# Patient Record
Sex: FEMALE | Race: BLACK OR AFRICAN AMERICAN | NOT HISPANIC OR LATINO | URBAN - METROPOLITAN AREA
[De-identification: names, ages, dates, MRNs, and addresses within clinical notes are randomized per-mention and may not be internally consistent; named-entity substitution may affect disease eponyms.]

---

## 2023-11-06 ENCOUNTER — APPOINTMENT (OUTPATIENT)
Dept: OBSTETRICS AND GYNECOLOGY | Facility: CLINIC | Age: 58
End: 2023-11-06
Payer: COMMERCIAL

## 2023-11-20 ENCOUNTER — APPOINTMENT (OUTPATIENT)
Dept: OBSTETRICS AND GYNECOLOGY | Facility: CLINIC | Age: 58
End: 2023-11-20
Payer: COMMERCIAL

## 2023-12-04 ENCOUNTER — APPOINTMENT (OUTPATIENT)
Dept: OBSTETRICS AND GYNECOLOGY | Facility: CLINIC | Age: 58
End: 2023-12-04
Payer: COMMERCIAL

## 2024-08-05 ENCOUNTER — HOSPITAL ENCOUNTER (OUTPATIENT)
Dept: RADIOLOGY | Facility: HOSPITAL | Age: 59
Discharge: HOME | End: 2024-08-05
Payer: COMMERCIAL

## 2024-08-05 ENCOUNTER — OFFICE VISIT (OUTPATIENT)
Dept: ORTHOPEDIC SURGERY | Facility: CLINIC | Age: 59
End: 2024-08-05
Payer: COMMERCIAL

## 2024-08-05 VITALS — HEIGHT: 68 IN | BODY MASS INDEX: 41.52 KG/M2 | WEIGHT: 274 LBS

## 2024-08-05 DIAGNOSIS — S89.91XD RIGHT KNEE INJURY, SUBSEQUENT ENCOUNTER: Primary | ICD-10-CM

## 2024-08-05 DIAGNOSIS — S89.91XD RIGHT KNEE INJURY, SUBSEQUENT ENCOUNTER: ICD-10-CM

## 2024-08-05 PROCEDURE — 3008F BODY MASS INDEX DOCD: CPT

## 2024-08-05 PROCEDURE — 1036F TOBACCO NON-USER: CPT

## 2024-08-05 PROCEDURE — 73564 X-RAY EXAM KNEE 4 OR MORE: CPT | Mod: RT

## 2024-08-05 PROCEDURE — 99204 OFFICE O/P NEW MOD 45 MIN: CPT

## 2024-08-05 PROCEDURE — 73564 X-RAY EXAM KNEE 4 OR MORE: CPT | Mod: RIGHT SIDE | Performed by: RADIOLOGY

## 2024-08-05 RX ORDER — METFORMIN HYDROCHLORIDE 500 MG/1
500 TABLET ORAL
COMMUNITY

## 2024-08-05 RX ORDER — ROSUVASTATIN CALCIUM 10 MG/1
10 TABLET, COATED ORAL DAILY
COMMUNITY

## 2024-08-05 ASSESSMENT — PAIN - FUNCTIONAL ASSESSMENT: PAIN_FUNCTIONAL_ASSESSMENT: 0-10

## 2024-08-05 ASSESSMENT — PAIN SCALES - GENERAL: PAINLEVEL_OUTOF10: 7

## 2024-08-05 NOTE — PROGRESS NOTES
SIMA  Kanwal Mccarty is a 59 y.o. female  in office today for   Chief Complaint   Patient presents with    Right Knee - Injury     Pt fell Saturday while on a cruise, she was seen there and given a brace. She is having trouble bearing weight, has been icing and elevating. Wearing the brace 8 hours a day.    .  she has been taking tramadol for pain which does help some      Medication  Current Outpatient Medications on File Prior to Visit   Medication Sig Dispense Refill    metFORMIN (Glucophage) 500 mg tablet Take 1 tablet (500 mg) by mouth 2 times daily (morning and late afternoon).      rosuvastatin (Crestor) 10 mg tablet Take 1 tablet (10 mg) by mouth once daily.       No current facility-administered medications on file prior to visit.       Physical Exam  Constitutional: well developed, well nourished female in no acute distress  Psychiatric: normal mood, appropriate affect  Eyes: sclera anicteric  HENT: normocephalic/atraumatic  CV: regular rate and rhythm   Respiratory: non labored breathing  Integumentary: no rash  Neurological: moves all extremities    Right Knee Exam     Tenderness   The patient is experiencing tenderness in the medial joint line and MCL.    Range of Motion   Extension:  0   Flexion:  50     Tests   Nieves:  Medial - positive Lateral - negative  Varus: negative Valgus: negative  Drawer:  Anterior - trace    Posterior - trace  Patellar apprehension: negative    Other   Erythema: absent  Scars: absent  Sensation: normal  Swelling: moderate              Imaging/Lab:  X-rays were taken today which were reviewed by myself and read by myself and show no acute fracture or dislocation.  Moderate degenerative changes worse medially with joint space narrowing and osteophyte formation      Assessment  Assessment: right knee injury    Plan  Plan:  History, physical exam, and imaging were reviewed with patient. Given mechanism of injury, decrease in ROM, inability to bear weight, positive PE,  entered MRI for concern of ACL and meniscus injury.  In the meantime, continue with brace, NWB, RICE and pain medication.  Follow Up: will follow up after MRI    All questions were answered for the patient prior to end of exam and patient addressed their understanding.    Lana Dorsey PA-C  08/05/24

## 2024-08-06 ENCOUNTER — HOSPITAL ENCOUNTER (OUTPATIENT)
Dept: RADIOLOGY | Facility: HOSPITAL | Age: 59
Discharge: HOME | End: 2024-08-06
Payer: COMMERCIAL

## 2024-08-06 DIAGNOSIS — S89.91XD RIGHT KNEE INJURY, SUBSEQUENT ENCOUNTER: ICD-10-CM

## 2024-08-06 PROCEDURE — 73721 MRI JNT OF LWR EXTRE W/O DYE: CPT | Mod: RIGHT SIDE | Performed by: RADIOLOGY

## 2024-08-06 PROCEDURE — 73721 MRI JNT OF LWR EXTRE W/O DYE: CPT | Mod: RT

## 2024-08-08 ENCOUNTER — TELEPHONE (OUTPATIENT)
Dept: ORTHOPEDIC SURGERY | Facility: CLINIC | Age: 59
End: 2024-08-08
Payer: COMMERCIAL

## 2024-08-08 NOTE — TELEPHONE ENCOUNTER
----- Message from Debra CASTANEDA sent at 8/8/2024  1:44 PM EDT -----  Left a message for the patient to call back in regards to the MRI results and next steps-per Lana Dorsey below.  ----- Message -----  From: Lana Dorsey PA-C  Sent: 8/8/2024   8:07 AM EDT  To: Debra Mills MA    Can you call this lady about her MRI results.  Let her know she has a meniscus tear.  She can be WB as tolerated, let her know we recommend PT and steroid injection before we would consider surgery.  She can schedule with me to review MRI further and if she wants injection.  ----- Message -----  From: Interface, Radiology Results In  Sent: 8/6/2024   3:51 PM EDT  To: Lana Dorsey PA-C

## 2024-08-09 ENCOUNTER — OFFICE VISIT (OUTPATIENT)
Dept: ORTHOPEDIC SURGERY | Facility: CLINIC | Age: 59
End: 2024-08-09
Payer: COMMERCIAL

## 2024-08-09 VITALS — BODY MASS INDEX: 41.52 KG/M2 | HEIGHT: 68 IN | WEIGHT: 274 LBS

## 2024-08-09 DIAGNOSIS — S83.221A PERIPHERAL TEAR OF MEDIAL MENISCUS OF RIGHT KNEE AS CURRENT INJURY, INITIAL ENCOUNTER: ICD-10-CM

## 2024-08-09 DIAGNOSIS — M25.561 RIGHT KNEE PAIN, UNSPECIFIED CHRONICITY: Primary | ICD-10-CM

## 2024-08-09 PROCEDURE — 2500000005 HC RX 250 GENERAL PHARMACY W/O HCPCS

## 2024-08-09 PROCEDURE — 20610 DRAIN/INJ JOINT/BURSA W/O US: CPT | Mod: RT

## 2024-08-09 PROCEDURE — 1036F TOBACCO NON-USER: CPT

## 2024-08-09 PROCEDURE — 3008F BODY MASS INDEX DOCD: CPT

## 2024-08-09 PROCEDURE — 99213 OFFICE O/P EST LOW 20 MIN: CPT

## 2024-08-09 PROCEDURE — 2500000004 HC RX 250 GENERAL PHARMACY W/ HCPCS (ALT 636 FOR OP/ED)

## 2024-08-09 RX ORDER — LIDOCAINE HYDROCHLORIDE 5 MG/ML
4 INJECTION, SOLUTION INFILTRATION; PERINEURAL
Status: COMPLETED | OUTPATIENT
Start: 2024-08-09 | End: 2024-08-09

## 2024-08-09 RX ORDER — TRIAMCINOLONE ACETONIDE 40 MG/ML
40 INJECTION, SUSPENSION INTRA-ARTICULAR; INTRAMUSCULAR
Status: COMPLETED | OUTPATIENT
Start: 2024-08-09 | End: 2024-08-09

## 2024-08-09 ASSESSMENT — PAIN - FUNCTIONAL ASSESSMENT: PAIN_FUNCTIONAL_ASSESSMENT: 0-10

## 2024-08-09 ASSESSMENT — PAIN DESCRIPTION - DESCRIPTORS: DESCRIPTORS: ACHING;SHARP;STABBING

## 2024-08-09 ASSESSMENT — PAIN SCALES - GENERAL: PAINLEVEL_OUTOF10: 7

## 2024-08-09 NOTE — PROGRESS NOTES
SIMA  Kanwal Mccarty is a 59 y.o. female in office today for follow up of side: right knee MRI review.  she states that the knee is feeling a little better than earlier in the week.  She can want a little better that this time.  She has continued to rest, elevating the knee.  Here to review MRI results and discuss next steps.      Physical Exam  Constitutional: well developed, well nourished female in no acute distress  Psychiatric: normal mood, appropriate affect  Eyes: sclera anicteric  HENT: normocephalic/atraumatic  CV: regular rate and rhythm   Respiratory: non labored breathing  Integumentary: no rash  Neurological: moves all extremities    Right Knee Exam     Tenderness   The patient is experiencing tenderness in the medial joint line.    Range of Motion   Extension:  0   Flexion:  80     Other   Erythema: absent  Scars: absent  Sensation: normal  Pulse: present  Swelling: mild  Effusion: no effusion present            Imaging/Lab:  MRI completed 8/6/24 which were reviewed by myself and read by radiology and show:   1. Medial meniscus tearing. There is blunting of the free margin of the posterior horn into posterior horn body junction of the medial meniscus possibly with a horizontal component of abnormal signal at the posterior horn body junction. Body of the medial meniscus is extruded peripherally. Lateral meniscus is intact.  2. Tricompartmental degenerative change of the knee, greatest in the patellofemoral joint space with hyaline articular cartilage fissuring/loss as above.  3. Multilobulated septated appearing structure of the anteromedial knee which may represent a ganglion cyst. There is possibly a communicating versus an adjacent blood vessel and a small vascular  malformation may be a differential consideration.  4. Small volume knee joint effusion.    Assessment  Assessment: Right knee meniscus tear, right knee pain    Plan  Plan:  History, physical exam, and imaging were reviewed with patient.  Discussed options for meniscus tears including conservative treatment of RICE, antiinflammatories, PT, and intjections versus surgical intervention such as a menisectomy.  Would like to proceed with conservative treatment at this time.  PT orders given to patient.  She is also electing for an injection and understands should she want to discuss surgery, it will be several months.  The right knee was injected per procedure note below.  She will also continue with RICE, NSAIDs, bracing as needed.  She is WB as tolerated.  Follow Up: Patient to follow up as needed if pain persists or gets worse.    Patient ID: Kanwal Mccarty is a 59 y.o. female.    L Inj/Asp: R knee on 8/9/2024 11:08 AM  Indications: pain  Details: 22 G needle, anterolateral approach  Medications: 40 mg triamcinolone acetonide 40 mg/mL; 4 mL lidocaine 5 mg/mL (0.5 %)  Outcome: tolerated well, no immediate complications  Procedure, treatment alternatives, risks and benefits explained, specific risks discussed. Consent was given by the patient. Immediately prior to procedure a time out was called to verify the correct patient, procedure, equipment, support staff and site/side marked as required. Patient was prepped and draped in the usual sterile fashion.           All questions were answered for the patient prior to end of exam and patient addressed their understanding.    Lana Dorsey PA-C  08/09/24

## 2024-08-14 ENCOUNTER — EVALUATION (OUTPATIENT)
Dept: PHYSICAL THERAPY | Facility: CLINIC | Age: 59
End: 2024-08-14
Payer: COMMERCIAL

## 2024-08-14 DIAGNOSIS — M25.561 RIGHT KNEE PAIN, UNSPECIFIED CHRONICITY: Primary | ICD-10-CM

## 2024-08-14 PROCEDURE — 97110 THERAPEUTIC EXERCISES: CPT | Mod: GP

## 2024-08-14 PROCEDURE — 97161 PT EVAL LOW COMPLEX 20 MIN: CPT | Mod: GP

## 2024-08-14 NOTE — PROGRESS NOTES
Physical Therapy Evaluation     Patient Name: Kanwal cMcarty  MRN: [id number]  Today's Date: 8/14/2024  Time Calculation  Start Time: 1114  Stop Time: 1159  Time Calculation (min): 45 min      Insurance:  Number of Treatments Authorized: 1/40 (1750 DED 80/20 COVERAGE 40 V REF -[id number] PER VIJAYA)          Current Problem:   1. 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   Leisure, mechanical stresses:  Travel. Dogs. - limited.  Watch TV/shows.   Prior to onset the pt was able to complete all work/leisure/functional activities without limitation.  Functional disability from present episode/Primary goal for PT: Daughter lives with her and can assist with dog care and cooking, lifting laundry.  Unable to drive. /  Resolve the pain and return to prior level of care    Present symptoms: 4/10 Right medial  knee  Present since: 8/3/24 and getting better after the steroid injection this past Friday   Commenced as a result of getting off a cruise ship slipped and fell onto her R knee    Symptoms at onset: R knee  Constant symptoms: R medial knee  Intermittent symptoms: none  Pain ranges from: 3-6/10  Pt describes pain as: stabbing/pressure/ache   Spine History: N/A  Symptoms are worse with: Pivoting/twisting - to the left.  Driving - pushing the gas/brake., standing - always limited to 1 hour, walking- sometimes, stairs - reciprocal a little painful, squatting/kneeling - always and unable, sleeping - Right sidelying and will wake her up a few times a night   Symptoms are better with: wearing brace, rest/elevation sitting  Continued use makes the pain: Worse  Other questions: Swelling, Clicking/locking, giving way/falling - no  Previous  episodes: none.  Fractured left knee cap in 2017 - resolved with conservative management/PT  Previous treatments: Steroid injection helped.  Ice/ elevation/rest.  Knee brace.   Imaging: MRI on 8/6/24 Medial meniscus tearing. There is blunting of the free margin of the posterior horn into posterior horn body junction of the medial meniscus possibly with a horizontal component of abnormal signal at the posterior horn body junction. Body of the medial meniscus is extruded peripherally. Lateral meniscus is intact. Tricompartmental degenerative change of the knee, greatest in the patellofemoral joint space with hyaline articular cartilage fissuring/loss as above. Multilobulated septated appearing structure of the anteromedial knee which may represent a ganglion cyst. There is possibly a communicating versus an adjacent blood vessel and a small vascular  malformation may be a differential consideration. Small volume knee joint effusion.  Red Flags: recent/major surgery - growth removed from arm Jan 2024, night pain - no, accidents - yes, unexplained weight loss - no    Objective Findings:  Outcome Measures:     Knee ROM - Nil/Min/Mod/Max limit or degrees.   Flex: R = 50 - ERP, L = 119    Ext: R = 0 - ERP with OP, L = 0    MMT: hip  ABd: R = 4/5, L = 4/5  IR: R = 3+/5 - limited by knee pain, L = 5/5  ER:  R = 3+/5 - limited by knee pain, L = 5/5  MMT: Knee  Flex:  R = 4-/5 - limited by knee pain, L = 5/5  Ext: R = 3+/5 - limited by knee pain, L = 5/5    Treatment:   - Supine Active Straight Leg Raise x 5  - Sidelying Hip Abduction x 5  - Supine Knee SAQ  x 5  - Supine Quad Set x 10  - Supine Heel Slide x 10  Educated pt on proper response to exercise, centralization/localization, produce/increase - NW principle, and assessment process.  Issued handout for HEP  HEP/med bridge:   Access Code: [email or web address]  URL: [email or web address]  Date: 08/14/2024  Prepared by: Bear Terrell  Exercises  - Supine Active  Straight Leg Raise  - 1 x daily - 7 x weekly - 2-3 sets - 10-20 reps  - Sidelying Hip Abduction  - 1 x daily - 7 x weekly - 2-3 sets - 10-20 reps  - Supine Knee Extension Strengthening  - 1 x daily - 7 x weekly - 2-3 sets - 10-20 reps  - Supine Quad Set  - 3-5 x daily - 7 x weekly - 1 sets - 10-20 reps - 5 hold  - Supine Heel Slide  - 3-5 x daily - 7 x weekly - 1 sets - 10-20 reps - 5 hold    Assessment: Pt presents to PT with complaint of R knee pain that began 8/3/24 after she slipped and fell onto her knee. Pt's history and response to repeated movements makes provisional classification recovering truama. Pt will continue to be assessed over the next 3-5 sessions to confirm provisional classification and DP. Pt will benefit from skilled PT to address ROM/strength/functional limitations and pain noted during evaluation today.    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 by week 6  Pt wll be able to cook without assist from her daughter by week 6  Pt will be able to drive without limitation from R knee pain by week 2  LTG:  R knee ROM >/= 0-115 without ERP with OP  by week 8  5/5 B LEs with MMTing by week 10  Pt will be able to walk her dog without limitation from R knee pain by week 8  Pt will achieve a clinically significant improvement in LEFS  by week 12  Pt will report >/= 80% improvement/progress towards PT goals by week 12    Planned interventions: Ther ex, Neuromuscular re-ed, ther act, Manual, Education, Home program, Estim, Hot therapy, Cold therapy, Vaso  The POC was created, discussed, and agreed on with the patient.

## 2024-08-22 ENCOUNTER — TREATMENT (OUTPATIENT)
Dept: PHYSICAL THERAPY | Facility: CLINIC | Age: 59
End: 2024-08-22
Payer: COMMERCIAL

## 2024-08-22 DIAGNOSIS — M25.561 RIGHT KNEE PAIN, UNSPECIFIED CHRONICITY: Primary | ICD-10-CM

## 2024-08-22 PROCEDURE — 97110 THERAPEUTIC EXERCISES: CPT | Mod: GP,CQ

## 2024-08-22 PROCEDURE — 97140 MANUAL THERAPY 1/> REGIONS: CPT | Mod: GP,CQ

## 2024-08-22 ASSESSMENT — PAIN SCALES - GENERAL: PAINLEVEL_OUTOF10: 4

## 2024-08-22 ASSESSMENT — PAIN - FUNCTIONAL ASSESSMENT: PAIN_FUNCTIONAL_ASSESSMENT: 0-10

## 2024-08-22 NOTE — PROGRESS NOTES
"Physical Therapy Treatment    Patient Name: Kanwal Mccarty  MRN: [id number]  Today's Date: 8/22/2024    Current Problem  Problem List Items Addressed This Visit             ICD-10-CM    Right knee pain - Primary M25.561       Insurance:  Payor: [employer] [facility] / Plan: [employer] / Product Type: *No Product type* /   Number of Treatments Authorized: 2/40 (1750 DED 80/20 COVERAGE 40 V REF -[id number] PER VIJAYA)          Subjective   General  Reason for Referral: Right knee pain, unspecified chronicity  Referred By: Lana Dorsey PA-C  Past Medical History Relevant to Rehab: REVIEWED MEDICAL HISTORY [facility]  General Comment: PT STATES SHE'S HAD SOME THROBBING IN HER R KNEE THE PAST COUPLE OF DAYS.  IT IS BETTER TODAY.    Performing HEP?: Yes    Precautions  Precautions  Precautions Comment: No Fall risk  Pain  Pain Assessment: 0-10  0-10 (Numeric) Pain Score: 4  Pain Location: Knee  Pain Orientation: Right    Objective   General Observation  General Observation: ANTALGIC GAIT (R KNEE ROM 0* - 88*)       Treatments:    Therapeutic Exercise  Therapeutic Exercise Activity 1: SCIFIT MAN L1 X 6 MIN  Therapeutic Exercise Activity 2: GASTROC STRETCH X 1 MIN  Therapeutic Exercise Activity 3: HEEL RAISES X 1 MIN  Therapeutic Exercise Activity 4: MINI SQUATS X 1 MIN  Therapeutic Exercise Activity 5: HEEL SLIDES WITH STRAP WITH QS X 6 MIN  Therapeutic Exercise Activity 6: SLR X 1 MIN  Therapeutic Exercise Activity 7: SAQ R/L ALT X 2 MIN  Therapeutic Exercise Activity 8: BRIDGE HOLD 5-10\" X 1 MIN         Manual Therapy  Manual Therapy Activity 1: R PF MOBS GRADE 2,3  Manual Therapy Activity 2: STM R QUAD, KNEE  Manual Therapy Activity 3: PROM R KNEE FLEX, EXT  Manual Therapy Activity 4: K-TAPE R KNEE  ()    H/O GIVEN                   OP EDUCATION:  Outpatient Education  Education Comment: Access Code: [email or web address]  URL: [email or web address]  Date: 08/22/2024  Prepared by: José Branch    " "Exercises  - Mini Squat with Counter Support  - 1-2 x daily - 7 x weekly - 1 sets - 20 reps  - Supine Bridge  - 1-2 x daily - 7 x weekly - 1 sets - 20 reps - 10\" hold    Assessment:  PT Assessment  Assessment Comment: PT STEPHANIE EX'S WELL.  NOTED MIN PAIN WITH BRIDGES IN HER R KNEE.  PT TIGHT AND TENDER IN HER R QUAD AND MED KNEE.  NOTED INCREASE IN R KNEE FLEX ROM.  PT FELT A LITTLE BETTER AFTER SESSION.    Plan:  OP PT Plan  PT Plan: Skilled PT (CONTINUE WITH INCREASING R KNEE ROM AND LE STRENGTH.  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 6  Pt wll be able to cook without assist from her daughter by week 6  Pt will be able to drive without limitation from R knee pain by week 2  LTG:  R knee ROM >/= 0-115 without ERP with OP  by week 8  5/5 B LEs with MMTing by week 10  Pt will be able to walk her dog without limitation from R knee pain by week 8  Pt will achieve a clinically significant improvement in LEFS  by week 12  Pt will report >/= 80% improvement/progress towards PT goals by week 12      Time Calculation  Start Time: 1332  Stop Time: 1415  Time Calculation (min): 43 min  PT Therapeutic Procedures Time Entry  Manual Therapy Time Entry: 20  Therapeutic Exercise Time Entry: 23,    "

## 2024-08-29 ENCOUNTER — TREATMENT (OUTPATIENT)
Dept: PHYSICAL THERAPY | Facility: CLINIC | Age: 59
End: 2024-08-29
Payer: COMMERCIAL

## 2024-08-29 DIAGNOSIS — M25.561 RIGHT KNEE PAIN, UNSPECIFIED CHRONICITY: ICD-10-CM

## 2024-08-29 PROCEDURE — 97140 MANUAL THERAPY 1/> REGIONS: CPT | Mod: GP

## 2024-08-29 PROCEDURE — 97110 THERAPEUTIC EXERCISES: CPT | Mod: GP

## 2024-08-29 NOTE — PROGRESS NOTES
"Physical Therapy Treatment    Patient Name: Kanwal Mccarty  MRN: [id number]  Today's Date: 8/29/2024    Current Problem  Problem List Items Addressed This Visit             ICD-10-CM    Right knee pain M25.561       Insurance:  Payor: [facility] / Plan: [employer] / Product Type: *No Product type* /   Number of Treatments Authorized: 3/40 (1750 DED 80/20 COVERAGE 40 V REF -[id number] PER VIJAYA)          Subjective   General   Brief intense pain randomly at rest., this started last week.  Sudden movements can still be very aggravating and its hard to get comfortable a night to fall sleep.  Overall things are about the same over the past few weeks. Felt fine after the last session, tape seemed to help.      Performing HEP?: Yes    Precautions  Precautions  Precautions Comment: No Fall risk  Pain   3/10 R knee    Objective           Treatments:  Therapeutic Exercise  SCIFIT MAN L1 X 6 MIN  Semilaoded R knee flexion x 15 - stretches, NW  B LAQ machine 10#s 1 min x 2   B HS curl machine 20#s 1 min x 2   TG L 3 squats 15 x 2   BRIDGE HOLD 5-10\" X 1 MIN  SLR X 1 MIN  SAQ R  X  1 MIN     Manual Therapy  R PF MOBS GRADE 2,3  STM R QUAD, KNEE  PROM R KNEE FLEX, EXT  K-TAPE R KNEE  ()         OP EDUCATION:   Access Code: [email or web address]  URL: [email or web address]  Date: 08/29/2024  Prepared by: Bear Terrell    Exercises  - Standing Knee Flexion Stretch on Step  - 3 x daily - 7 x weekly - 1 sets - 10-20 reps    Date: 08/22/2024 Prepared by: José Branch   Exercises - Mini Squat with Counter Support - 1-2 x daily - 7 x weekly - 1 sets - 20 reps - Supine Bridge - 1-2 x daily - 7 x weekly - 1 sets - 20 reps - 10\" hold     Assessment:   Pt's function is slowly progressing but minimal improvement in sx.  Tolerated the session well today, reported a \"pop\" with pain while squatting on the TG but sx returned to baseline after.  Had a good response to manual and KT tape.     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 by week 6  Pt wll be able to cook without assist from her daughter by week 6  Pt will be able to drive without limitation from R knee pain by week 2  LTG:  R knee ROM >/= 0-115 without ERP with OP  by week 8  5/5 B LEs with MMTing by week 10  Pt will be able to walk her dog without limitation from R knee pain by week 8  Pt will achieve a clinically significant improvement in LEFS  by week 12  Pt will report >/= 80% improvement/progress towards PT goals by week 12      Time Calculation  Start Time: 1010  Stop Time: 1056  Time Calculation (min): 46 min  PT Therapeutic Procedures Time Entry  Manual Therapy Time Entry: 14  Therapeutic Exercise Time Entry: 30,

## 2024-09-05 ENCOUNTER — TREATMENT (OUTPATIENT)
Dept: PHYSICAL THERAPY | Facility: CLINIC | Age: 59
End: 2024-09-05
Payer: COMMERCIAL

## 2024-09-05 DIAGNOSIS — M25.561 RIGHT KNEE PAIN, UNSPECIFIED CHRONICITY: ICD-10-CM

## 2024-09-05 PROCEDURE — 97112 NEUROMUSCULAR REEDUCATION: CPT | Mod: GP,CQ

## 2024-09-05 PROCEDURE — 97110 THERAPEUTIC EXERCISES: CPT | Mod: GP,CQ

## 2024-09-05 PROCEDURE — 97140 MANUAL THERAPY 1/> REGIONS: CPT | Mod: GP,CQ

## 2024-09-05 ASSESSMENT — PAIN - FUNCTIONAL ASSESSMENT: PAIN_FUNCTIONAL_ASSESSMENT: 0-10

## 2024-09-05 ASSESSMENT — PAIN SCALES - GENERAL: PAINLEVEL_OUTOF10: 0 - NO PAIN

## 2024-09-05 NOTE — PROGRESS NOTES
Physical Therapy Treatment    Patient Name: Kanwal Mccarty  MRN: [id number]  Today's Date: 9/5/2024    Current Problem  Problem List Items Addressed This Visit             ICD-10-CM    Right knee pain M25.561       Insurance:  Payor: [employer] [facility] / Plan: [employer] / Product Type: *No Product type* /   Number of Treatments Authorized: 4/40 (1750 DED 80/20 COVERAGE 40 V REF -[id number] PER VIJAYA)          Subjective   General  Reason for Referral: Right knee pain, unspecified chronicity  Referred By: Lana Dorsey PA-C  General Comment: PT STATES SHE CURRENTLY HAS NO KNEE PAIN.  OVERALL, SHE IS SEEING IMPROVEMENTS.    Performing HEP?: Yes    Precautions  Precautions  Precautions Comment: No Fall risk  Pain  Pain Assessment: 0-10  0-10 (Numeric) Pain Score: 0 - No pain  Pain Location: Knee  Pain Orientation: Right    Objective   General Observation  General Observation: R KNEE ROM 0* - 107*       Treatments:    Therapeutic Exercise  Therapeutic Exercise Activity 1: SCIFIT MAN L1 X 6 MIN  Therapeutic Exercise Activity 2: GASTROC STRETCH X 1 MIN  Therapeutic Exercise Activity 3: HEEL RAISES X 1 MIN  Therapeutic Exercise Activity 4: DYNAMICS TIN SOLDIER, MARCH, BUTT KICK  Therapeutic Exercise Activity 5: HEEL SLIDES WITH STRAP WITH QS X 6 MIN  Therapeutic Exercise Activity 6: SLR X 1 MIN  Therapeutic Exercise Activity 7: SAQ R/L ALT 2# X 2 MIN    Balance/Neuromuscular Re-Education  Balance/Neuromuscular Re-Education Activity 1: BIODEX RANDOM SLOW L10 X 3 MIN    Manual Therapy  Manual Therapy Activity 1: R PF MOBS GRADE 2,3  Manual Therapy Activity 2: STM R QUAD, KNEE  Manual Therapy Activity 3: PROM R KNEE FLEX, EXT  Manual Therapy Activity 4: K-TAPE R KNEE () H/O GIVEN                   OP EDUCATION:  Outpatient Education  Education Comment: CONTINUE WITH CURRENT HEP    Assessment:  PT Assessment  Assessment Comment: PT STEPHANIE EX'S WELL.  SHE CONTINUES TO IMPROVE WITH HER KNEE ROM AND LE  STRENGTH.  NO C/O PAIN DURING SESSION.    Plan:  OP PT Plan  PT Plan: Skilled PT (CONTINUE WITH INCREASING R KNEE ROM AND LE STRENGTH.  ASSESS K-TAPE NEXT VISIT)  Onset Date: 08/14/24  Number of Treatments Authorized: 4/40 (1750 DED 80/20 COVERAGE 40 V REF -[id number] COTY LILLY)    Goals:       Time Calculation  Start Time: 1122  Stop Time: 1200  Time Calculation (min): 38 min  PT Therapeutic Procedures Time Entry  Manual Therapy Time Entry: 19  Neuromuscular Re-Education Time Entry: 4  Therapeutic Exercise Time Entry: 15,

## 2024-09-12 ENCOUNTER — TREATMENT (OUTPATIENT)
Dept: PHYSICAL THERAPY | Facility: CLINIC | Age: 59
End: 2024-09-12
Payer: COMMERCIAL

## 2024-09-12 DIAGNOSIS — M25.561 RIGHT KNEE PAIN, UNSPECIFIED CHRONICITY: ICD-10-CM

## 2024-09-12 PROCEDURE — 97110 THERAPEUTIC EXERCISES: CPT | Mod: GP

## 2024-09-12 PROCEDURE — 97140 MANUAL THERAPY 1/> REGIONS: CPT | Mod: GP

## 2024-09-12 NOTE — PROGRESS NOTES
Physical Therapy Treatment    Patient Name: Kanwal Mccarty  MRN: [id number]  Today's Date: 9/12/2024    Current Problem  Problem List Items Addressed This Visit             ICD-10-CM    Right knee pain M25.561       Insurance:  Payor: [employer] [place] / Plan: [employer] / Product Type: *No Product type* /   Number of Treatments Authorized: 5/40 (1750 DED 80/20 COVERAGE 40 V REF -[id number] PER VIJAYA)          Subjective   General   The knee is sore today.  Did a lot over the weekend because she was traveling Just standing and walking a lot on it. Overall things seem to be continuing to get better.  Laying on her side will still bother her knee., tried putting a pillow between her knee but it did not help.   Likes the tape, makes it feel better/more supported.     Performing HEP?: Partially - due to traveling     Precautions  Precautions  Precautions Comment: No Fall risk  Pain    4-5/10 R knee     Objective           Treatments:  Therapeutic Exercise  SCIFIT MAN L1 X 6 MIN  DYNAMICS TIN SOLDIER, MARCH, BUTT KICK, fwd lunge, heel walk, toe walk x 40 ft each   R HS curl machine 20#s 1 min x 2   R LAQ machine 10#s 1 min x 2   R/L lateral steps with yellow loop x 40ft each direction  F/B diagonal steps with yellow loop x 40ft each direction  F/B monster walks with yellow loop x 40ft each direction  TG L7 squats 1 min x 2   Semiloaded R knee flexion  x 10       Manual Therapy  R PF MOBS GRADE 2,3  STM R QUAD, KNEE  PROM R KNEE FLEX, EXT  K-TAPE R KNEE () H/O GIVEN       OP EDUCATION:       Assessment:   Pt continues to slowly porgress per subjective report. Tolerated strengthening without complaint of lasting increase in pain and felt better leaving PT after manual.      Plan:  OP PT Plan  Number of Treatments Authorized: 5/40 (1750 DED 80/20 COVERAGE 40 V REF -[id number] COTY LILLY)Continue to progress strength and ROM as tolerated  Reassess for derangement if not progressing as expected  .           Time Calculation  Start Time: 1132  Stop Time: 1217  Time Calculation (min): 45 min  PT Therapeutic Procedures Time Entry  Manual Therapy Time Entry: 11  Therapeutic Exercise Time Entry: 32,

## 2024-09-26 ENCOUNTER — TREATMENT (OUTPATIENT)
Dept: PHYSICAL THERAPY | Facility: CLINIC | Age: 59
End: 2024-09-26
Payer: COMMERCIAL

## 2024-09-26 DIAGNOSIS — M25.561 RIGHT KNEE PAIN, UNSPECIFIED CHRONICITY: ICD-10-CM

## 2024-09-26 PROCEDURE — 97110 THERAPEUTIC EXERCISES: CPT | Mod: GP

## 2024-09-26 PROCEDURE — 97140 MANUAL THERAPY 1/> REGIONS: CPT | Mod: GP

## 2024-09-26 ASSESSMENT — PAIN - FUNCTIONAL ASSESSMENT: PAIN_FUNCTIONAL_ASSESSMENT: 0-10

## 2024-09-26 ASSESSMENT — PAIN SCALES - GENERAL: PAINLEVEL_OUTOF10: 0 - NO PAIN

## 2024-09-26 NOTE — PROGRESS NOTES
"  Physical Therapy Treatment    Patient Name: Kanwal Mccarty  MRN: [id number]  Today's Date: 9/26/2024  Time Calculation  Start Time: 1149  Stop Time: 1228  Time Calculation (min): 39 min  PT Therapeutic Procedures Time Entry  Manual Therapy Time Entry: 15  Therapeutic Exercise Time Entry: 23       Current Problem  1. Right knee pain, unspecified chronicity  Follow Up In Physical Therapy        Problem List Items Addressed This Visit             ICD-10-CM    Right knee pain M25.561        Insurance:  Episode Count: 6  Number of Treatments Authorized: 6/40 (1750 DED 80/20 COVERAGE 40 V REF -[id number] PER VIJAYA)        Payor: [employer] [facility] / Plan: [employer] / Product Type: *No Product type* /     Subjective   General  Reason for Referral: Right knee pain, unspecified chronicity  Referred By: Lana Dorsey PA-C  General Comment: Pt states that she doesn't have pain today in her right knee. She states that she was doing a lot stairs the past couple of days and noticed increase soreness and difficulty performing those.    Performing HEP?: Yes    Precautions  Precautions  Precautions Comment: No Fall risk  Pain  Pain Assessment: 0-10  0-10 (Numeric) Pain Score: 0 - No pain  Pain Location: Knee  Pain Orientation: Right       Objective   KNEE  Knee Palpation/Joint Mobility  Palpation/Joint Mobility Comment: TTP R adductors and pes anserine  Knee AROM  R knee flexion: (140°): 105  R knee extension: (0°): 0  Knee PROM  R knee flexion: (140°): 107  R knee extension: (0°): 0    Treatments:  Therapeutic Exercise  Therapeutic Exercise Activity 1: SCIFIT MAN L1 X 6 MIN  Therapeutic Exercise Activity 2: GASTROC STRETCH 2x30\"  Therapeutic Exercise Activity 3: HEEL RAISES 2x12  Therapeutic Exercise Activity 4: DYNAMICS TIN SOLDIER, MARCH, BUTT KICK  Therapeutic Exercise Activity 5: R/L Lateral steps, Y TB, x40'  Therapeutic Exercise Activity 6: F/B Diagonal Steps, Y TB, x40'  Therapeutic Exercise Activity " "7: TG double leg squats, L7, 2x10  Therapeutic Exercise Activity 8: 6\" Fwd step ups to posed march, 2x10    Manual Therapy  Manual Therapy Activity 1: R PF MOBS GRADE 2,3  Manual Therapy Activity 2: STM R QUAD, KNEE  Manual Therapy Activity 3: PROM R KNEE FLEX, EXT  Manual Therapy Activity 4: K-TAPE R KNEE () H/O GIVEN       OP EDUCATION:  Outpatient Education  Education Comment: CONTINUE WITH CURRENT HEP    Assessment:  PT Assessment  Assessment Comment: Emphasis of today's session was progressing her LE strength and knee mobility. Initiated step ups this visit to help progress her functional mobility. She had increased tenderness of her adductors this session as well as over pes anserine. Overall, she tolerated the session well without any increase in sxs, but she did need 1 rest break due to muscle fatigue. Continue to progress as tolerated.  Deficits remain with LE strength, knee ROM/mobility, and pain. These limitation prevent the patient in performing typical ADLs and desired functional activities. Patient will continue to benefit from skilled PT to address the above limitations.    Plan:     PT Plan: Skilled PT (CONTINUE WITH INCREASING R KNEE ROM AND LE STRENGTH.  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

## 2024-10-03 ENCOUNTER — TREATMENT (OUTPATIENT)
Dept: PHYSICAL THERAPY | Facility: CLINIC | Age: 59
End: 2024-10-03
Payer: COMMERCIAL

## 2024-10-03 DIAGNOSIS — M25.561 RIGHT KNEE PAIN, UNSPECIFIED CHRONICITY: ICD-10-CM

## 2024-10-03 PROCEDURE — 97140 MANUAL THERAPY 1/> REGIONS: CPT | Mod: GP

## 2024-10-03 PROCEDURE — 97110 THERAPEUTIC EXERCISES: CPT | Mod: GP

## 2024-10-03 ASSESSMENT — PAIN - FUNCTIONAL ASSESSMENT: PAIN_FUNCTIONAL_ASSESSMENT: 0-10

## 2024-10-03 ASSESSMENT — PAIN SCALES - GENERAL: PAINLEVEL_OUTOF10: 0 - NO PAIN

## 2024-10-03 NOTE — PROGRESS NOTES
"  Physical Therapy Treatment    Patient Name: Kanwal Mccarty  MRN: [id number]  Today's Date: 10/3/2024  Time Calculation  Start Time: 1150  Stop Time: 1230  Time Calculation (min): 40 min  PT Therapeutic Procedures Time Entry  Manual Therapy Time Entry: 12  Therapeutic Exercise Time Entry: 26       Current Problem  1. Right knee pain, unspecified chronicity  Follow Up In Physical Therapy        Problem List Items Addressed This Visit             ICD-10-CM    Right knee pain M25.561        Insurance:  Episode Count: 7  Number of Treatments Authorized: 7/40 (1750 DED 80/20 COVERAGE 40 V REF -[id number] PER VIJAYA)        Payor: [employer] [facility] / Plan: [employer] / Product Type: *No Product type* /     Subjective   General  Reason for Referral: Right knee pain, unspecified chronicity  Referred By: Lana Dorsey PA-C  General Comment: Pt states that she doesn't have any pain today. She states that she continues to have some issues with stairs at home that she wants to work on that today.    Performing HEP?: Yes    Precautions  Precautions  Precautions Comment: No Fall risk  Pain  Pain Assessment: 0-10  0-10 (Numeric) Pain Score: 0 - No pain  Pain Location: Knee  Pain Orientation: Right       Objective   KNEE  Knee Palpation/Joint Mobility  Palpation/Joint Mobility Comment: TTP R adductors and pes anserine  Knee AROM  R knee flexion: (140°): 105  R knee extension: (0°): 0  Knee PROM  R knee flexion: (140°): 107  R knee extension: (0°): 0    Treatments:  Therapeutic Exercise  Therapeutic Exercise Activity 1: SCIFIT MAN L2 X 6 MIN  Therapeutic Exercise Activity 2: GASTROC STRETCH 2x30\"  Therapeutic Exercise Activity 3: HEEL RAISES 2x12  Therapeutic Exercise Activity 4: DYNAMICS TIN SOLDIER, MARCH, BUTT KICK  Therapeutic Exercise Activity 5: R/L Lateral steps, Y TB, x40'  Therapeutic Exercise Activity 6: F/B Diagonal Steps, Y TB, x40'  Therapeutic Exercise Activity 7: TG double leg squats, L7, " "2x10  Therapeutic Exercise Activity 8: 6\" Fwd step ups to posed march, 2x10  Therapeutic Exercise Activity 9: 6\" Lateral Step Ups, 2x10 each    Manual Therapy  Manual Therapy Activity 1: R PF MOBS GRADE 2,3  Manual Therapy Activity 2: STM R adductors, KNEE  Manual Therapy Activity 3: PROM R KNEE FLEX, EXT  Manual Therapy Activity 4: K-TAPE R KNEE ()       OP EDUCATION:  Outpatient Education  Education Comment: Continue with current HEP    Assessment:  PT Assessment  Assessment Comment: Emphasis of today's session was progressing her LE strength and knee mobility. Progressed her step ups this session to incorporate frontal plane. She had no increase in symptoms throughout the session. She responded well to manual tasks at the end of the session to alleviate some muscle soreness. Continue to progress as tolerated.  Deficits remain with LE strength/endurance, functional mobility, and knee mobility. These limitation prevent the patient in performing typical ADLs and desired functional activities. Patient will continue to benefit from skilled PT to address the above limitations.    Plan:     PT Plan: Skilled PT (CONTINUE WITH INCREASING R KNEE ROM AND LE STRENGTH.  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

## 2024-10-16 ENCOUNTER — TREATMENT (OUTPATIENT)
Dept: PHYSICAL THERAPY | Facility: CLINIC | Age: 59
End: 2024-10-16
Payer: COMMERCIAL

## 2024-10-16 DIAGNOSIS — M25.561 RIGHT KNEE PAIN, UNSPECIFIED CHRONICITY: ICD-10-CM

## 2024-10-16 PROCEDURE — 97140 MANUAL THERAPY 1/> REGIONS: CPT | Mod: GP

## 2024-10-16 PROCEDURE — 97110 THERAPEUTIC EXERCISES: CPT | Mod: GP
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
## 2024-10-17 ENCOUNTER — APPOINTMENT (OUTPATIENT)
Dept: PHYSICAL THERAPY | Facility: CLINIC | Age: 59
End: 2024-10-17
Payer: COMMERCIAL

## 2024-10-24 ENCOUNTER — APPOINTMENT (OUTPATIENT)
Dept: PHYSICAL THERAPY | Facility: CLINIC | Age: 59
End: 2024-10-24
Payer: COMMERCIAL

## 2024-10-31 ENCOUNTER — APPOINTMENT (OUTPATIENT)
Dept: PHYSICAL THERAPY | Facility: CLINIC | Age: 59
End: 2024-10-31
Payer: COMMERCIAL

## 2025-01-16 ENCOUNTER — DOCUMENTATION (OUTPATIENT)
Dept: PHYSICAL THERAPY | Facility: CLINIC | Age: 60
End: 2025-01-16
Payer: COMMERCIAL

## 2025-01-16 NOTE — PROGRESS NOTES
Discharge Summary    Name: Kanwal Mccarty  MRN: [id number]  : 1965  Date: 25    Discharge Summary: PT    Discharge Information: Date of discharge 25    Therapy Summary: Good HEP compliance, slowly progressing towards goals.    Discharge Status: Knee pain varies. If she is on her feet for the entire day it will ache towards the end of the day. Laying her side will irritate it as well, just hard to get comfortable. Also, stairs can be bothersome if she goes up/down the stairs a lot. Feels about 75% of prior level/achieved PT goals.      Rehab Discharge Reason: Patient requested due to  pt requested would like to continue independently with HEP

## 2025-01-31 ENCOUNTER — DOCUMENTATION (OUTPATIENT)
Dept: PHYSICAL THERAPY | Facility: CLINIC | Age: 60
End: 2025-01-31
Payer: COMMERCIAL

## 2025-01-31 NOTE — PROGRESS NOTES
Discharge Summary    Name: Kanwal Mccarty  MRN: [id number]  : 1965  Date: 25    Discharge Summary: PT    Discharge Information: Date of discharge 25    Therapy Summary: Good HEP compliance, slowly progressing towards goals.     Discharge Status: Knee pain varies. If she is on her feet for the entire day it will ache towards the end of the day. Laying her side will irritate it as well, just hard to get comfortable. Also, stairs can be bothersome if she goes up/down the stairs a lot. Feels about 75% of prior level/achieved PT goals.       Rehab Discharge Reason: Patient requested due to  pt requested would like to continue independently with HEP